# Patient Record
Sex: MALE | Race: OTHER
[De-identification: names, ages, dates, MRNs, and addresses within clinical notes are randomized per-mention and may not be internally consistent; named-entity substitution may affect disease eponyms.]

---

## 2018-05-08 ENCOUNTER — HOSPITAL ENCOUNTER (EMERGENCY)
Dept: HOSPITAL 62 - ER | Age: 48
Discharge: HOME | End: 2018-05-08
Payer: SELF-PAY

## 2018-05-08 DIAGNOSIS — R10.30: Primary | ICD-10-CM

## 2018-05-08 DIAGNOSIS — K92.1: ICD-10-CM

## 2018-05-08 DIAGNOSIS — Z87.891: ICD-10-CM

## 2018-05-08 DIAGNOSIS — R19.7: ICD-10-CM

## 2018-05-08 PROCEDURE — 99283 EMERGENCY DEPT VISIT LOW MDM: CPT

## 2018-05-08 NOTE — ER DOCUMENT REPORT
ED General





- General


Chief Complaint: Bloody Stools


Stated Complaint: RECTAL BLEEDING


Time Seen by Provider: 05/08/18 12:42


TRAVEL OUTSIDE OF THE [place] IN LAST 30 DAYS: No





- HPI


Patient complains to provider of: Bloody diarrhea


Notes: 





Patient coming in for evaluation of bloody diarrhea.  Patient states after 

eating dinner yesterday he had multiple bouts of bloody diarrhea approximately 

6.  Patient was seen at local urgent care told to come to the ER for further 

evaluation.  Patient states he was having lower abdominal pain however at this 

time is not having any bleeding stabbing abdominal pain denies any fevers 

chills nausea vomiting diarrhea denies any recent travel.





- Related Data


Allergies/Adverse Reactions: 


 





No Known Allergies Allergy (Verified 05/08/18 11:29)


 











Past Medical History





- Social History


Smoking Status: Former Smoker


Chew tobacco use (# tins/day): No


Frequency of alcohol use: None


Drug Abuse: None


Family History: Reviewed & Not Pertinent


Patient has suicidal ideation: No


Patient has homicidal ideation: No


Renal/ Medical History: Denies: Hx Peritoneal Dialysis





Review of Systems





- Review of Systems


Constitutional: No symptoms reported


EENT: No symptoms reported


Cardiovascular: No symptoms reported


Respiratory: No symptoms reported


Gastrointestinal: Rectal bleeding


Genitourinary: No symptoms reported


Male Genitourinary: No symptoms reported


Musculoskeletal: No symptoms reported


Skin: No symptoms reported


Hematologic/Lymphatic: No symptoms reported


Neurological/Psychological: No symptoms reported


-: Yes All other systems reviewed and negative





Physical Exam





- Vital signs


Interpretation: Normal





- General


General appearance: Appears well, Alert





- HEENT


Head: Normocephalic, Atraumatic


Eyes: Normal


Pupils: PERRL





- Respiratory


Respiratory status: No respiratory distress


Chest status: Nontender


Breath sounds: Normal


Chest palpation: Normal





- Cardiovascular


Rhythm: Regular


Heart sounds: Normal auscultation


Murmur: No





- Abdominal


Inspection: Normal


Distension: No distension


Bowel sounds: Normal


Tenderness: Nontender


Organomegaly: No organomegaly





- Rectal


Tenderness: Yes


Stool: Other - Light brown stool in rectal examination


Hemorrhoids: None





- Back


Back: Normal, Nontender





- Extremities


General upper extremity: Normal inspection, Nontender, Normal color, Normal ROM

, Normal temperature


General lower extremity: Normal inspection, Nontender, Normal color, Normal ROM

, Normal temperature, Normal weight bearing.  No: Rosa's sign





- Neurological


Neuro grossly intact: Yes


Cognition: Normal


Orientation: AAOx4


Steen Coma Scale Eye Opening: Spontaneous


Sonja Coma Scale Verbal: Oriented


Steen Coma Scale Motor: Obeys Commands


Sonja Coma Scale Total: 15


Speech: Normal


Motor strength normal: LUE, RUE, LLE, RLE


Sensory: Normal





- Psychological


Associated symptoms: Normal affect, Normal mood





- Skin


Skin Temperature: Warm


Skin Moisture: Dry


Skin Color: Normal





Course





- Re-evaluation


Re-evalutation: 





05/08/18 20:32


Long discussion with patient at bedside and his evaluation today revealed light 

brown stool.  Patient otherwise asymptomatic with no dizziness no chest pain no 

lightheadedness patient ask about cost of evaluation cost of lab test costs CAT 

scans.  I did review differential for bloody diarrhea.  Patient otherwise 

states he feels fine declines any lab work or any other invasive testing today 

as at his physical examination otherwise within normal limits.  States he 

cannot afford any further testing on the physical examination is otherwise fine 

he is okay to be discharged home.  Explained to patient the bland diet for the 

next few days return if the bleeding worsens.





Discharge





- Discharge


Clinical Impression: 


Abdominal pain


Qualifiers:


 Abdominal location: unspecified location Qualified Code(s): R10.9 - 

Unspecified abdominal pain





Condition: Stable


Disposition: HOME, SELF-CARE


Instructions:  Abdominal Pain ([facility]), Low Residue Diet ([facility]), Rectal Bleeding, 

Unclear Cause ([facility])


Additional Instructions: 


Your physical examination today is otherwise normal.  Did not know the exact 

etiology of your rectal bleeding.  If this occurs again I would recommend 

returning to ER for another evaluation.  Please follow-up with your physician 

or the clinic provided.  Take medication as prescribed.


Prescriptions: 


Dicyclomine HCl [Bentyl 20 mg Tablet] 20 mg PO QID #40 tablet


Omeprazole 20 mg PO DAILY #30 capsule.dr


Forms:  Return to Work